# Patient Record
Sex: FEMALE | Race: WHITE | NOT HISPANIC OR LATINO | Employment: FULL TIME | URBAN - METROPOLITAN AREA
[De-identification: names, ages, dates, MRNs, and addresses within clinical notes are randomized per-mention and may not be internally consistent; named-entity substitution may affect disease eponyms.]

---

## 2023-03-08 ENCOUNTER — OFFICE VISIT (OUTPATIENT)
Dept: OBGYN CLINIC | Facility: CLINIC | Age: 28
End: 2023-03-08

## 2023-03-08 VITALS — WEIGHT: 130 LBS | DIASTOLIC BLOOD PRESSURE: 78 MMHG | SYSTOLIC BLOOD PRESSURE: 120 MMHG

## 2023-03-08 DIAGNOSIS — Z76.89 ENCOUNTER TO ESTABLISH CARE: Primary | ICD-10-CM

## 2023-03-08 DIAGNOSIS — N94.6 DYSMENORRHEA: ICD-10-CM

## 2023-03-08 PROBLEM — F90.9 ADHD: Status: ACTIVE | Noted: 2023-03-08

## 2023-03-08 PROBLEM — N80.9 ENDOMETRIOSIS: Status: ACTIVE | Noted: 2023-03-08

## 2023-03-08 PROBLEM — J45.909 ASTHMA: Status: ACTIVE | Noted: 2023-03-08

## 2023-03-08 RX ORDER — MULTIVITAMIN WITH IRON
TABLET ORAL
COMMUNITY

## 2023-03-08 RX ORDER — DEXTROAMPHETAMINE SACCHARATE, AMPHETAMINE ASPARTATE, DEXTROAMPHETAMINE SULFATE AND AMPHETAMINE SULFATE 3.75; 3.75; 3.75; 3.75 MG/1; MG/1; MG/1; MG/1
TABLET ORAL
COMMUNITY
Start: 2022-12-30

## 2023-03-08 RX ORDER — IBUPROFEN 800 MG/1
800 TABLET ORAL EVERY 8 HOURS PRN
Qty: 30 TABLET | Refills: 1 | Status: SHIPPED | OUTPATIENT
Start: 2023-03-08

## 2023-03-08 NOTE — PROGRESS NOTES
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 [place]  She is happily for a year  She is a  who presents to [facility] care  She was initially scheduled for annual visit but started her menses today and her flow is heavy  She will schedule an appt for her annual at a later time  Her cycles are 31-32 days apart  They are very heavy and painful - lasting 5 days  She treats her symptoms with Advil and tylenol  She believes she has endometriosis  She was not told verbally by her prior GYN but when she received the summary of what was done from her insurance it stated that endometrial tissue was removed from her ovary and pelvis  Last GYN exam was in 2019  Last pap smear was a year ago in [place] and it was normal per patient  History of right cystectomy with lysis of adhesion and endometrial tissue removal in 2019  History of breast augmentation  History of right hip surgery for labral repair and tendon release  Significant Family history: Maternal grandmother has had recurrent colon cancer x 3 (between the ages of 36 and 66's) is now in remission  She has also had lymphoma (in her [de-identified]) and is now in remission  Menstrual History:    OB History        2    Para        Term                AB   2    Living           SAB        IAB        Ectopic        Multiple        Live Births                      Patient's last menstrual period was 2023    Period Cycle (Days):  (31-32 days)  Period Duration (Days): 5  Period Pattern: Regular  Menstrual Flow: Heavy, Moderate (heavy for first 3 days, 4th day is moderate and completely stops day 5)  Menstrual Control: Tampon, Maxi pad  Dysmenorrhea: (!) Severe  Dysmenorrhea Symptoms: Cramping, Nausea, Diarrhea    The following portions of the patient's history were reviewed and updated as appropriate: allergies, current medications, past family history, past medical history, past social history, past surgical history and problem list     Review of Systems  Pertinent items are noted in HPI  Objective      /78   Wt 59 kg (130 lb)   LMP 03/08/2023       Assessment and Plan    Weston Singh was seen today for gynecologic exam     Diagnoses and all orders for this visit:    Encounter to establish care    Dysmenorrhea  -     ibuprofen (MOTRIN) 800 mg tablet; Take 1 tablet (800 mg total) by mouth every 8 (eight) hours as needed for moderate pain      She will request medical records from her prior GYN and PCP to be sent so that we can review them  A prescription for Ibuprofen was provided today to help with her dysmenorrhea  She does not desire birth control as they will be family planning within a year  She will schedule an appt to return for annual exam when her menses has stopped